# Patient Record
Sex: FEMALE | NOT HISPANIC OR LATINO | URBAN - METROPOLITAN AREA
[De-identification: names, ages, dates, MRNs, and addresses within clinical notes are randomized per-mention and may not be internally consistent; named-entity substitution may affect disease eponyms.]

---

## 2020-11-04 ENCOUNTER — APPOINTMENT (RX ONLY)
Dept: URBAN - METROPOLITAN AREA CLINIC 116 | Facility: CLINIC | Age: 70
Setting detail: DERMATOLOGY
End: 2020-11-04

## 2020-11-04 DIAGNOSIS — Z41.9 ENCOUNTER FOR PROCEDURE FOR PURPOSES OTHER THAN REMEDYING HEALTH STATE, UNSPECIFIED: ICD-10-CM

## 2020-11-04 PROCEDURE — ? COOLSCULPTING

## 2020-11-04 NOTE — PROCEDURE: COOLSCULPTING
Consent: Written consent obtained, risks reviewed including, but not limited to, blistering from suction, darker or lighter pigmentary change, bruising, and/or need for multiple treatments.
Applicator Size: CoolAdvantage Core Plus
Time (Minutes - Will Only Render If Nonzero): [address]
Device: Coolsculpting
Time (Minutes - Will Only Render If Nonzero): [address] [facility] [place]
Suction Settings: The suction settings were per protocol.
Location 1: lower abdomen (right)
Applicator Size: CoolAdvantage Core
Time (Minutes - Will Only Render If Nonzero): 35
Intro: Prior to treatment, the area was cleaned with alcohol and marked out with a marking pen. The gel sheet was then applied uniformly. The applicator was applied to the skin with good contact and suction.
Location 2: lower abdomen (left)
Time (Minutes - Will Only Render If Nonzero): 45
Applicator Size: CoolAdvantage Fit
Location 3: upper abdomen (right)
Post Treatment: After treatment, the suction was turned off, and the applicator was removed from the skin.
Applicator Size: CoolCore applicator
Location 4: upper abdomen (left)
Detail Level: Zone